# Patient Record
Sex: FEMALE | Race: WHITE | URBAN - NONMETROPOLITAN AREA
[De-identification: names, ages, dates, MRNs, and addresses within clinical notes are randomized per-mention and may not be internally consistent; named-entity substitution may affect disease eponyms.]

---
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
## 2024-05-17 ENCOUNTER — HOSPITAL ENCOUNTER (EMERGENCY)
Facility: HOSPITAL | Age: 24
Discharge: HOME | End: 2024-05-17
Attending: EMERGENCY MEDICINE
Payer: COMMERCIAL

## 2024-05-17 ENCOUNTER — APPOINTMENT (EMERGENCY)
Dept: RADIOLOGY | Facility: HOSPITAL | Age: 24
End: 2024-05-17
Payer: COMMERCIAL

## 2024-05-17 VITALS
HEART RATE: 97 BPM | WEIGHT: 120 LBS | SYSTOLIC BLOOD PRESSURE: 125 MMHG | OXYGEN SATURATION: 98 % | RESPIRATION RATE: 16 BRPM | BODY MASS INDEX: 21.26 KG/M2 | HEIGHT: 63 IN | TEMPERATURE: 97.7 F | DIASTOLIC BLOOD PRESSURE: 73 MMHG

## 2024-05-17 DIAGNOSIS — M79.645 FINGER PAIN, LEFT: Primary | ICD-10-CM

## 2024-05-17 PROCEDURE — 29130 APPL FINGER SPLINT STATIC: CPT | Performed by: EMERGENCY MEDICINE

## 2024-05-17 PROCEDURE — 99284 EMERGENCY DEPT VISIT MOD MDM: CPT | Performed by: EMERGENCY MEDICINE

## 2024-05-17 PROCEDURE — 99283 EMERGENCY DEPT VISIT LOW MDM: CPT

## 2024-05-17 PROCEDURE — 73140 X-RAY EXAM OF FINGER(S): CPT

## 2024-05-17 NOTE — Clinical Note
Asia Jeffers was seen and treated in our emergency department on 5/17/2024.                Diagnosis:     Asia  .    She may return on this date:     Light duty with limited use of left hand until cleared by orthopedics/family doctor.     If you have any questions or concerns, please don't hesitate to call.      Shania Ramos, DO    ______________________________           _______________          _______________  Hospital Representative                              Date                                Time

## 2024-05-17 NOTE — Clinical Note
Asia Jeffers was seen and treated in our emergency department on 5/17/2024.        No work until cleared by Family Doctor/Orthopedics        Diagnosis:     Asia  .    She may return on this date:          If you have any questions or concerns, please don't hesitate to call.      Shania Ramos, DO    ______________________________           _______________          _______________  Hospital Representative                              Date                                Time

## 2024-05-17 NOTE — Clinical Note
Asia Jeffers was seen and treated in our emergency department on 5/17/2024.                Diagnosis:     Asia  is off the rest of the shift today.    She may return on this date:          If you have any questions or concerns, please don't hesitate to call.      Shania Ramos, DO    ______________________________           _______________          _______________  Hospital Representative                              Date                                Time

## 2024-05-18 NOTE — ED PROVIDER NOTES
History  Chief Complaint   Patient presents with    Finger Pain     Pt reports lump on left index finger for a few weeks.      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 No pertinent past medical history.    History reviewed. No pertinent surgical history.    History reviewed. No pertinent family history.  I have reviewed and agree with the history as documented.    E-Cigarette/Vaping     E-Cigarette/Vaping Substances     Social History     Tobacco Use    Smoking status: Every Day     Current packs/day: 0.50     Types: Cigarettes    Smokeless tobacco: Never   Substance Use Topics    Alcohol use: Never    Drug use: Not Currently       Review of Systems   Constitutional: Negative.    HENT: Negative.     Eyes: Negative.    Respiratory: Negative.     Cardiovascular: Negative.    Gastrointestinal: Negative.    Endocrine: Negative.    Genitourinary: Negative.    Musculoskeletal:  Positive for arthralgias.   Skin: Negative.    Allergic/Immunologic: Negative.    Neurological: Negative.    Hematological: Negative.    Psychiatric/Behavioral: Negative.         Physical Exam  Physical Exam  Constitutional:       Appearance: She is well-developed.   HENT:      Head: Normocephalic and atraumatic.   Eyes:      Conjunctiva/sclera: Conjunctivae normal.      Pupils: Pupils are equal, round, and reactive to light.   Cardiovascular:      Rate and Rhythm: Normal rate.   Pulmonary:      Effort: Pulmonary effort is normal.   Abdominal:       Palpations: Abdomen is soft.   Musculoskeletal:         General: Normal range of motion.        Hands:       Cervical back: Normal range of motion and neck supple.      Comments: Mild swelling with tenderness over the proximal and middle phalanx of the left second finger, no bony deformity, distal sensation and motor is intact with brisk capillary refill   Skin:     General: Skin is warm and dry.   Neurological:      Mental Status: She is alert and oriented to person, place, and time.         Vital Signs  ED Triage Vitals [05/17/24 2013]   Temperature Pulse Respirations Blood Pressure SpO2   97.7 °F (36.5 °C) 97 16 125/73 98 %      Temp Source Heart Rate Source Patient Position - Orthostatic VS BP Location FiO2 (%)   Temporal Monitor Sitting Left arm --      Pain Score       3           Vitals:    05/17/24 2013   BP: 125/73   Pulse: 97   Patient Position - Orthostatic VS: Sitting         Visual Acuity      ED Medications  Medications - No data to display    Diagnostic Studies  Results Reviewed       None                   XR finger second digit-index LEFT   ED Interpretation by Shania Ramos DO (05/17 2050)   No fracture or dislocation                 Procedures  Splint application    Date/Time: 5/17/2024 9:19 PM    Performed by: Shania Ramos DO  Authorized by: Shania Ramos DO  Universal Protocol:  Consent: Verbal consent obtained.  Risks and benefits: risks, benefits and alternatives were discussed  Consent given by: patient  Patient understanding: patient states understanding of the procedure being performed  Required items: required blood products, implants, devices, and special equipment available  Patient identity confirmed: verbally with patient    Pre-procedure details:     Sensation:  Normal    Skin color:  Pink  Procedure details:     Laterality:  Left    Location:  Finger    Finger:  L index finger    Strapping: no      Splint type:  Finger splint, static  Post-procedure details:     Pain:   Improved    Sensation:  Normal    Skin color:  Pink    Patient tolerance of procedure:  Tolerated well, no immediate complications           ED Course                               SBIRT 20yo+      Flowsheet Row Most Recent Value   Initial Alcohol Screen: US AUDIT-C     1. How often do you have a drink containing alcohol? 0 Filed at: 05/17/2024 2013   2. How many drinks containing alcohol do you have on a typical day you are drinking?  0 Filed at: 05/17/2024 2013   3a. Male UNDER 65: How often do you have five or more drinks on one occasion? 0 Filed at: 05/17/2024 2013   3b. FEMALE Any Age, or MALE 65+: How often do you have 4 or more drinks on one occassion? 0 Filed at: 05/17/2024 2013   Audit-C Score 0 Filed at: 05/17/2024 2013   DELBERT: How many times in the past year have you...    Used an illegal drug or used a prescription medication for non-medical reasons? Never Filed at: 05/17/2024 2013                      Medical Decision Making   Patient presents with left second finger pain times several weeks after injury.  Given history, exam and work-up, patient likely has finger sprain.  I have low suspicion for fracture, dislocation, significant ligamentous injury, septic arthritis, gout flare, new autoimmune arthropathy or gonococcal arthropathy.      Problems Addressed:  Finger pain, left: acute illness or injury    Amount and/or Complexity of Data Reviewed  Radiology: ordered and independent interpretation performed. Decision-making details documented in ED Course.    Risk  OTC drugs.             Disposition  Final diagnoses:   Finger pain, left     Time reflects when diagnosis was documented in both MDM as applicable and the Disposition within this note       Time User Action Codes Description Comment    5/17/2024  8:51 PM Shania Ramos Add [M79.645] Finger pain, left           ED Disposition       ED Disposition   Discharge    Condition   Stable    Date/Time   Fri May 17, 2024 2051    Comment   Asia Jeffers  discharge to home/self care.                   Follow-up Information       Follow up With Specialties Details Why Contact Info    Dong Gomez MD Orthopedic Surgery, Sports Medicine In 1 week  [address] [facility]  [address] [place], [place] [id number] [phone]              There are no discharge medications for this patient.          PDMP Review       None            ED Provider  Electronically Signed by             Shania Ramos DO  05/17/24 4344

## 2024-05-25 ENCOUNTER — APPOINTMENT (EMERGENCY)
Dept: RADIOLOGY | Facility: HOSPITAL | Age: 24
End: 2024-05-25
Payer: COMMERCIAL

## 2024-05-25 ENCOUNTER — HOSPITAL ENCOUNTER (EMERGENCY)
Facility: HOSPITAL | Age: 24
Discharge: HOME | End: 2024-05-25
Attending: EMERGENCY MEDICINE | Admitting: EMERGENCY MEDICINE
Payer: COMMERCIAL

## 2024-05-25 VITALS
BODY MASS INDEX: 21.26 KG/M2 | SYSTOLIC BLOOD PRESSURE: 121 MMHG | WEIGHT: 120 LBS | OXYGEN SATURATION: 96 % | DIASTOLIC BLOOD PRESSURE: 75 MMHG | RESPIRATION RATE: 17 BRPM | HEART RATE: 68 BPM | TEMPERATURE: 98.2 F | HEIGHT: 63 IN

## 2024-05-25 DIAGNOSIS — S00.33XA CONTUSION OF NOSE, INITIAL ENCOUNTER: Primary | ICD-10-CM

## 2024-05-25 PROCEDURE — 70150 X-RAY EXAM OF FACIAL BONES: CPT

## 2024-05-25 PROCEDURE — 99284 EMERGENCY DEPT VISIT MOD MDM: CPT | Performed by: PHYSICIAN ASSISTANT

## 2024-05-25 PROCEDURE — 99283 EMERGENCY DEPT VISIT LOW MDM: CPT
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
## 2024-05-25 NOTE — Clinical Note
Asia Jeffers was seen and treated in our emergency department on 5/25/2024.                Diagnosis:     Asia  may return to work on return date.    She may return on this date: 05/27/2024         If you have any questions or concerns, please don't hesitate to call.      Crissy Caban PA-C    ______________________________           _______________          _______________  Hospital Representative                              Date                                Time

## 2024-05-25 NOTE — DISCHARGE INSTRUCTIONS
I recommend alternating between Tylenol and Motrin.  Avoid any contact sports until pain has completely resolved.  Follow-up with family doctor.  Please return with any new or worsening symptoms  Your x-ray was reviewed today in the emergency department and there was no obvious abnormalities found, however, the imaging will be reviewed by the radiologist later this evening or the following day and if there is any abnormalities found you will get a phone call with those results.